# Patient Record
Sex: MALE | Race: WHITE
[De-identification: names, ages, dates, MRNs, and addresses within clinical notes are randomized per-mention and may not be internally consistent; named-entity substitution may affect disease eponyms.]

---

## 2022-02-21 ENCOUNTER — HOSPITAL ENCOUNTER (OUTPATIENT)
Dept: HOSPITAL 79 - OR | Age: 57
Discharge: HOME | End: 2022-02-21
Payer: COMMERCIAL

## 2022-02-21 DIAGNOSIS — Z93.3: ICD-10-CM

## 2022-02-21 DIAGNOSIS — Z90.49: ICD-10-CM

## 2022-02-21 DIAGNOSIS — Z09: Primary | ICD-10-CM

## 2022-02-21 DIAGNOSIS — Z87.19: ICD-10-CM

## 2022-02-21 DIAGNOSIS — K57.32: ICD-10-CM

## 2022-02-21 DIAGNOSIS — Z20.822: ICD-10-CM

## 2022-02-22 ENCOUNTER — HOSPITAL ENCOUNTER (INPATIENT)
Dept: HOSPITAL 79 - OR | Age: 57
LOS: 1 days | Discharge: HOME | DRG: 331 | End: 2022-02-23
Attending: SURGERY | Admitting: SURGERY
Payer: COMMERCIAL

## 2022-02-22 VITALS — WEIGHT: 218.25 LBS | HEIGHT: 71 IN | BODY MASS INDEX: 30.56 KG/M2

## 2022-02-22 DIAGNOSIS — Z87.19: ICD-10-CM

## 2022-02-22 DIAGNOSIS — F17.210: ICD-10-CM

## 2022-02-22 DIAGNOSIS — Z20.822: ICD-10-CM

## 2022-02-22 DIAGNOSIS — Z43.3: Primary | ICD-10-CM

## 2022-02-22 DIAGNOSIS — Z87.01: ICD-10-CM

## 2022-02-22 PROCEDURE — 3E03329 INTRODUCTION OF OTHER ANTI-INFECTIVE INTO PERIPHERAL VEIN, PERCUTANEOUS APPROACH: ICD-10-PCS | Performed by: SURGERY

## 2022-02-22 PROCEDURE — 0DBN4ZZ EXCISION OF SIGMOID COLON, PERCUTANEOUS ENDOSCOPIC APPROACH: ICD-10-PCS | Performed by: SURGERY

## 2022-02-23 NOTE — NUR
PT HAS VIDED SINCE [facility]IL[facility] REMOVAL, HE HAS HAD A BM WITH SCANT AMOUNT OF BLOOD
IN STOOL , POST BOWEL SURGERY. HE HAS AMBULATED IN THE HALLWAY MULTIPLE TIMES.

## 2022-05-25 ENCOUNTER — HOSPITAL ENCOUNTER (EMERGENCY)
Dept: HOSPITAL 79 - ER1 | Age: 57
Discharge: LEFT BEFORE DISCHARGE | End: 2022-05-25
Payer: COMMERCIAL

## 2022-05-25 DIAGNOSIS — K94.09: Primary | ICD-10-CM

## 2022-05-25 LAB
BUN/CREATININE RATIO: 13 (ref 0–10)
HGB BLD-MCNC: 17.3 GM/DL (ref 14–17.5)
RED BLOOD COUNT: 5.32 M/UL (ref 4.2–5.5)
WHITE BLOOD COUNT: 9.6 K/UL (ref 4.5–11)

## 2022-07-05 ENCOUNTER — HOSPITAL ENCOUNTER (OUTPATIENT)
Dept: HOSPITAL 79 - OR | Age: 57
Discharge: HOME | End: 2022-07-05
Attending: SURGERY
Payer: COMMERCIAL

## 2022-07-05 DIAGNOSIS — K66.0: ICD-10-CM

## 2022-07-05 DIAGNOSIS — Z93.3: ICD-10-CM

## 2022-07-05 DIAGNOSIS — K43.9: Primary | ICD-10-CM

## 2022-07-05 DIAGNOSIS — K57.32: ICD-10-CM

## 2022-07-07 ENCOUNTER — HOSPITAL ENCOUNTER (OUTPATIENT)
Dept: HOSPITAL 79 - OR | Age: 57
Discharge: HOME | End: 2022-07-07
Attending: SURGERY
Payer: COMMERCIAL

## 2022-07-07 DIAGNOSIS — K43.2: Primary | ICD-10-CM

## 2022-07-07 DIAGNOSIS — K57.32: ICD-10-CM

## 2022-07-07 PROCEDURE — C1781 MESH (IMPLANTABLE): HCPCS
